# Patient Record
Sex: FEMALE | Race: ASIAN | NOT HISPANIC OR LATINO | Employment: UNEMPLOYED | URBAN - METROPOLITAN AREA
[De-identification: names, ages, dates, MRNs, and addresses within clinical notes are randomized per-mention and may not be internally consistent; named-entity substitution may affect disease eponyms.]

---

## 2023-08-25 ENCOUNTER — APPOINTMENT (OUTPATIENT)
Dept: MRI IMAGING | Facility: HOSPITAL | Age: 23
End: 2023-08-25
Attending: PHYSICIAN ASSISTANT
Payer: COMMERCIAL

## 2023-08-25 ENCOUNTER — APPOINTMENT (OUTPATIENT)
Dept: CT IMAGING | Facility: HOSPITAL | Age: 23
End: 2023-08-25
Attending: PHYSICIAN ASSISTANT
Payer: COMMERCIAL

## 2023-08-25 ENCOUNTER — HOSPITAL ENCOUNTER (EMERGENCY)
Facility: HOSPITAL | Age: 23
Discharge: HOME | End: 2023-08-26
Attending: EMERGENCY MEDICINE | Admitting: EMERGENCY MEDICINE
Payer: COMMERCIAL

## 2023-08-25 VITALS
WEIGHT: 95 LBS | RESPIRATION RATE: 16 BRPM | DIASTOLIC BLOOD PRESSURE: 56 MMHG | HEART RATE: 65 BPM | OXYGEN SATURATION: 97 % | TEMPERATURE: 98.4 F | SYSTOLIC BLOOD PRESSURE: 98 MMHG

## 2023-08-25 DIAGNOSIS — Z86.69 HISTORY OF MIGRAINE: ICD-10-CM

## 2023-08-25 DIAGNOSIS — R55 VASOVAGAL SYNCOPE: ICD-10-CM

## 2023-08-25 DIAGNOSIS — R51.9 HEADACHE: ICD-10-CM

## 2023-08-25 LAB
ANION GAP SERPL CALCULATED.3IONS-SCNC: 12 MMOL/L (ref 7–15)
BUN SERPL-MCNC: 14.7 MG/DL (ref 6–20)
CALCIUM SERPL-MCNC: 9.8 MG/DL (ref 8.6–10)
CHLORIDE SERPL-SCNC: 105 MMOL/L (ref 98–107)
CREAT SERPL-MCNC: 0.76 MG/DL (ref 0.51–0.95)
D DIMER PPP FEU-MCNC: 0.29 UG/ML FEU (ref 0–0.5)
DEPRECATED HCO3 PLAS-SCNC: 24 MMOL/L (ref 22–29)
ERYTHROCYTE [DISTWIDTH] IN BLOOD BY AUTOMATED COUNT: 12.4 % (ref 10–15)
GFR SERPL CREATININE-BSD FRML MDRD: >90 ML/MIN/1.73M2
GLUCOSE BLDC GLUCOMTR-MCNC: 88 MG/DL (ref 70–99)
GLUCOSE SERPL-MCNC: 102 MG/DL (ref 70–99)
HCG SERPL QL: NEGATIVE
HCT VFR BLD AUTO: 39.8 % (ref 35–47)
HGB BLD-MCNC: 13.6 G/DL (ref 11.7–15.7)
MAGNESIUM SERPL-MCNC: 2.2 MG/DL (ref 1.7–2.3)
MCH RBC QN AUTO: 28.2 PG (ref 26.5–33)
MCHC RBC AUTO-ENTMCNC: 34.2 G/DL (ref 31.5–36.5)
MCV RBC AUTO: 82 FL (ref 78–100)
PLATELET # BLD AUTO: 338 10E3/UL (ref 150–450)
POTASSIUM SERPL-SCNC: 4 MMOL/L (ref 3.4–5.3)
RBC # BLD AUTO: 4.83 10E6/UL (ref 3.8–5.2)
SODIUM SERPL-SCNC: 141 MMOL/L (ref 136–145)
TROPONIN T SERPL HS-MCNC: <6 NG/L
WBC # BLD AUTO: 13.4 10E3/UL (ref 4–11)

## 2023-08-25 PROCEDURE — 96360 HYDRATION IV INFUSION INIT: CPT | Mod: 59

## 2023-08-25 PROCEDURE — 70546 MR ANGIOGRAPH HEAD W/O&W/DYE: CPT

## 2023-08-25 PROCEDURE — 70496 CT ANGIOGRAPHY HEAD: CPT

## 2023-08-25 PROCEDURE — 85027 COMPLETE CBC AUTOMATED: CPT | Performed by: PHYSICIAN ASSISTANT

## 2023-08-25 PROCEDURE — A9585 GADOBUTROL INJECTION: HCPCS | Mod: JZ | Performed by: EMERGENCY MEDICINE

## 2023-08-25 PROCEDURE — 82962 GLUCOSE BLOOD TEST: CPT

## 2023-08-25 PROCEDURE — 255N000002 HC RX 255 OP 636: Mod: JZ | Performed by: EMERGENCY MEDICINE

## 2023-08-25 PROCEDURE — 250N000011 HC RX IP 250 OP 636: Mod: JZ | Performed by: PHYSICIAN ASSISTANT

## 2023-08-25 PROCEDURE — 99285 EMERGENCY DEPT VISIT HI MDM: CPT | Mod: 25

## 2023-08-25 PROCEDURE — 85379 FIBRIN DEGRADATION QUANT: CPT | Performed by: PHYSICIAN ASSISTANT

## 2023-08-25 PROCEDURE — 83735 ASSAY OF MAGNESIUM: CPT | Performed by: PHYSICIAN ASSISTANT

## 2023-08-25 PROCEDURE — 93005 ELECTROCARDIOGRAM TRACING: CPT | Performed by: PHYSICIAN ASSISTANT

## 2023-08-25 PROCEDURE — 258N000003 HC RX IP 258 OP 636: Performed by: PHYSICIAN ASSISTANT

## 2023-08-25 PROCEDURE — 36415 COLL VENOUS BLD VENIPUNCTURE: CPT | Performed by: PHYSICIAN ASSISTANT

## 2023-08-25 PROCEDURE — 250N000013 HC RX MED GY IP 250 OP 250 PS 637: Performed by: PHYSICIAN ASSISTANT

## 2023-08-25 PROCEDURE — 70498 CT ANGIOGRAPHY NECK: CPT

## 2023-08-25 PROCEDURE — 84703 CHORIONIC GONADOTROPIN ASSAY: CPT | Performed by: PHYSICIAN ASSISTANT

## 2023-08-25 PROCEDURE — 80048 BASIC METABOLIC PNL TOTAL CA: CPT | Performed by: PHYSICIAN ASSISTANT

## 2023-08-25 PROCEDURE — 84484 ASSAY OF TROPONIN QUANT: CPT | Performed by: PHYSICIAN ASSISTANT

## 2023-08-25 PROCEDURE — 70553 MRI BRAIN STEM W/O & W/DYE: CPT

## 2023-08-25 PROCEDURE — 96361 HYDRATE IV INFUSION ADD-ON: CPT

## 2023-08-25 RX ORDER — IBUPROFEN 200 MG
400 TABLET ORAL PRN
COMMUNITY

## 2023-08-25 RX ORDER — POLYETHYLENE GLYCOL 3350 17 G/17G
1 POWDER, FOR SOLUTION ORAL DAILY PRN
COMMUNITY
Start: 2023-07-12

## 2023-08-25 RX ORDER — SOMATROPIN 5 MG/1.5ML
INJECTION, SOLUTION SUBCUTANEOUS SEE ADMIN INSTRUCTIONS
COMMUNITY
Start: 2023-06-13

## 2023-08-25 RX ORDER — HYDROCORTISONE 10 MG/1
10 TABLET ORAL 2 TIMES DAILY
COMMUNITY
Start: 2023-07-12

## 2023-08-25 RX ORDER — ACETAMINOPHEN 325 MG/1
975 TABLET ORAL ONCE
Status: COMPLETED | OUTPATIENT
Start: 2023-08-25 | End: 2023-08-25

## 2023-08-25 RX ORDER — GADOBUTROL 604.72 MG/ML
4.5 INJECTION INTRAVENOUS ONCE
Status: COMPLETED | OUTPATIENT
Start: 2023-08-25 | End: 2023-08-25

## 2023-08-25 RX ORDER — IOPAMIDOL 755 MG/ML
75 INJECTION, SOLUTION INTRAVASCULAR ONCE
Status: COMPLETED | OUTPATIENT
Start: 2023-08-25 | End: 2023-08-25

## 2023-08-25 RX ORDER — ACETAMINOPHEN 500 MG
500 TABLET ORAL PRN
COMMUNITY

## 2023-08-25 RX ADMIN — IOPAMIDOL 75 ML: 755 INJECTION, SOLUTION INTRAVENOUS at 18:44

## 2023-08-25 RX ADMIN — ACETAMINOPHEN 975 MG: 325 TABLET ORAL at 17:19

## 2023-08-25 RX ADMIN — GADOBUTROL 4.5 ML: 604.72 INJECTION INTRAVENOUS at 21:44

## 2023-08-25 RX ADMIN — SODIUM CHLORIDE 1000 ML: 9 INJECTION, SOLUTION INTRAVENOUS at 17:15

## 2023-08-25 ASSESSMENT — ENCOUNTER SYMPTOMS
VOMITING: 0
NECK STIFFNESS: 0
DIZZINESS: 0
LIGHT-HEADEDNESS: 0
ABDOMINAL PAIN: 0
SPEECH DIFFICULTY: 0
SHORTNESS OF BREATH: 0
NECK PAIN: 0
CHILLS: 0
WEAKNESS: 1
HEADACHES: 1
FEVER: 0
NAUSEA: 1

## 2023-08-25 ASSESSMENT — ACTIVITIES OF DAILY LIVING (ADL)
ADLS_ACUITY_SCORE: 35

## 2023-08-25 NOTE — ED PROVIDER NOTES
"Emergency Department Encounter   NAME: Daniel Alvarez ; AGE: 23 year old female ; YOB: 2000 ; MRN: [id number] ; PCP: No primary care provider on file.   ED PROVIDER: Onelia Workman PA-C    Evaluation Date & Time:   8/25/2023  4:11 PM    CHIEF COMPLAINT:  Syncope and Headache      Impression and Plan   MDM:   Daniel Alvarez is a 23 year old female with a limited medical history who presents to the ED by EMS for evaluation of syncope/headache.  The patient presents to the emergency department after having a near syncopal episode at the [employer] today.  She reports that she only drink 1 bottle of water, [place], went on several rides, and then felt nauseated and near syncopal.  She laid down under a tree, and her group that she was with was giving her water and a cold pack, however she went \"in and out of consciousness\".  She was evaluated by paramedics, and her blood pressure was found to be 100 over 80s.  She reported a headache and continued nausea, and was transported to the ER for further evaluation.  On my evaluation of her, she is generally well-appearing, nontoxic, alert and holding a conversation.  At baseline per patient and mother.  She states she still has a headache in the setting of a migraine history which she usually treats with Tylenol and ibuprofen at home.  She is concerned about dehydration given her lack of fluid intake, and the temperature being in the 80s and quite humid today.  We discussed plan at this time for syncope work-up including EKG and labs.  We will treat her symptomatically with IV fluids and Tylenol.  Given her headache that started after her syncopal event, did consider subarachnoid hemorrhage, however she reports headache is consistent with atypical migraine, she appears quite comfortable, and I suspect this may be related to dehydration.  Will obtain CTA especially as patient is within the 6-hour window, and if " this is negative, no further work-up for subarachnoid hemorrhage is indicated at this time.    EKG shows sinus rhythm with sinus arrhythmia, no evidence of pathologic arrhythmia.  Nonspecific T wave inversions in V1 and V2 noted.  Troponin within normal range.  No concerns for ACS.  Blood glucose normal.  No metabolic derangements or KESHAV.  Negative pregnancy test.  No anemia.  Labs notable for a mild leukocytosis of 13.4 likely nonspecific.  Low suspicion for PE as she is not having any chest pain or shortness of breath, and vital signs are within normal limits, and she is PERC negative, however D-dimer sent and returned normal.  CTA was negative for subarachnoid hemorrhage or aneurysm, however radiologist did call to discuss prominent opacified superior sagittal sinus, however feels that this could also represent a subtle thin subdural hematoma though less likely.  Radiologist advised conservative follow-up with either repeat head CT in 12 hours, or obtaining MRI of brain as this would be definitive.  Discussed assessing for cerebral venous thrombosis on the CTA, however he is unable to fully visualize the transverse or sigmoid sinus, and advises dedicated imaging to rule this out. Discussed imaging options and admission with patient and mother, they would prefer MRI with hopes of discharging to home. MRI shows that area of thickening in the sagittal sinus on CT was a prominent vein coursing through. No evidence of subdural hematoma. No venous sinus thrombosis or other emergent findings. Nonspecific loss of fatty marrow on T1 sequence which is likely physiologic bone marrow hyperplasia, however did discuss with patient and mother. Mother felt this was seen on previous imaging and they are aware though advised discussion with patient's primary and included in paperwork in case they feel further workup is indicated on a nonemergent basis. Patient has been sleeping comfortably, headache is completely resolved, and  thorough work-up is reassuring.  At this time, I suspect her symptoms were due to vasovagal syncope in the setting of poor fluid intake in the heat which also resulted in headache.  We reviewed supportive measures for home, close follow-up in clinic, and concerning signs and symptoms to return to the ER.  Patient and mother verbalized understanding and she was discharged home.      Medical Decision Making    History:  Supplemental history from: Documented in chart, if applicable  External Record(s) reviewed: Documented in chart, if applicable.    Work Up:  Chart documentation includes differential considered and any EKGs or imaging independently interpreted by provider, where specified.  In additional to work up documented, I considered the following work up: Documented in chart, if applicable.    External consultation:  Discussion of management with another provider: Documented in chart, if applicable    Complicating factors:  Care impacted by chronic illness: Chronic Pain and Other: chiari malformation  Care affected by social determinants of health: N/A    Disposition considerations: Admission considered though patient discharged. No recommendations on prescription strength medication(s). See documentation for any additional details.      ED COURSE:  4:12 PM I met and introduced myself to the patient. I gathered initial history and performed my physical exam. We discussed plan for initial workup.   5:09 PM Nursing staff wasn't able to get an IV in, so called PICC team to place the IV.  7:10 PM [facility] radiology, Dr. Gringon called about CTA results.   7:18 PM I have staffed the patient with Dr. Gary, ED MD, who has evaluated the patient and agrees with all aspects of today's care.   7:32 PM I called [place] [facility], and spoke with Dr. Ferrera.   7:43 PM I rechecked the patient - headache resolved. Discussed plan for MRI which mother and patient are agreeable to.   12:00 AM I rechecked the patient and  "discussed results, discharge, follow up, and reasons to return to the ED.     At the conclusion of the encounter I discussed the results of all the tests and the disposition. The questions were answered. The patient or family acknowledged understanding and was agreeable with the care plan.    FINAL IMPRESSION:    ICD-10-CM    1. Vasovagal syncope  R55       2. Headache  R51.9       3. History of migraine  Z86.69             MEDICATIONS GIVEN IN THE EMERGENCY DEPARTMENT:  Medications   0.9% sodium chloride BOLUS (0 mLs Intravenous Stopped 8/25/23 1953)   acetaminophen (TYLENOL) tablet 975 mg (975 mg Oral $Given 8/25/23 1719)   iopamidol (ISOVUE-370) solution 75 mL (75 mLs Intravenous $Given 8/25/23 1844)   gadobutrol (GADAVIST) injection 4.5 mL (4.5 mLs Intravenous $Given 8/25/23 2144)         NEW PRESCRIPTIONS STARTED AT TODAY'S ED VISIT:  New Prescriptions    No medications on file         HPI   Patient information was obtained from: patient and family.   Use of Intrepreter: N/A     Daniel Alvarez is a 23 year old female with a limited medical history who presents to the ED by EMS for evaluation of syncope/headache.     Patient arrived at the [facility] today around 11 AM. This afternoon shortly after a couple of spinning/slingshot rides, she started feeling nauseous. She felt like she couldn't walk and \"collapsed\". Family described her as \"fading in and out.\" She was able to lay down in the shade for several minutes, and the syncopal episode lasted about 5-10 minutes. Patient's family and friend gave her some water and ice, and they put ice water bottle on her neck. She was evaluated by [facility] medics who noted her blood pressure was slightly low around 100/80. With the syncopal episode, she also developed a headache which she describes as a pressure sensation. She has a history of frequent migraines due to Chiari malformation, but her current headache is different due to the pressure sensation. The headache " persists in the ED today. She normally takes Tylenol or Ibuprofen for her migraines. She has no previous history of syncope. Patient denies any associated chest pain, shortness of breath, and vomiting. She only drank one water bottle today prior to the syncopal episode today. She does not report any other complaints or concerns at this time. Of note, the patient takes daily Hydrocortisone and Growth Hormone. She has low vision at baseline that is unchanged today.      REVIEW OF SYSTEMS:  Review of Systems   Constitutional:  Negative for chills and fever.   Respiratory:  Negative for shortness of breath.    Cardiovascular:  Negative for chest pain.   Gastrointestinal:  Positive for nausea. Negative for abdominal pain and vomiting.   Musculoskeletal:  Negative for neck pain and neck stiffness.   Neurological:  Positive for syncope, weakness (generalized) and headaches (pressure sensation). Negative for dizziness, speech difficulty and light-headedness.   All other systems reviewed and are negative.        Medical History     History reviewed. No pertinent past medical history.    History reviewed. No pertinent surgical history.    History reviewed. No pertinent family history.         acetaminophen (TYLENOL) 500 MG tablet  hydrocortisone (CORTEF) 10 MG tablet  ibuprofen (ADVIL/MOTRIN) 200 MG tablet  NORDITROPIN FLEXPRO 5 MG/1.5ML SOPN  polyethylene glycol (MIRALAX) 17 GM/Dose powder          Physical Exam     First Vitals:  Patient Vitals for the past 24 hrs:   BP Temp Temp src Pulse Resp SpO2 Weight   08/25/23 2317 98/56 -- -- 65 -- 97 % --   08/25/23 1559 105/86 98.4  F (36.9  C) Oral 94 16 96 % 43.1 kg (95 lb)         PHYSICAL EXAM:   Physical Exam  Vitals and nursing note reviewed.   Constitutional:       General: She is not in acute distress.     Appearance: She is not ill-appearing or toxic-appearing.   HENT:      Head: Normocephalic and atraumatic.   Eyes:      Conjunctiva/sclera: Conjunctivae normal.       Comments: Legally blind. Pupils are equal and round.   Cardiovascular:      Rate and Rhythm: Normal rate and regular rhythm.      Pulses: Normal pulses.      Heart sounds: Normal heart sounds. No murmur heard.  Pulmonary:      Effort: Pulmonary effort is normal.      Breath sounds: Normal breath sounds.   Abdominal:      General: Abdomen is flat. There is no distension.      Palpations: Abdomen is soft.      Tenderness: There is no abdominal tenderness. There is no guarding or rebound.   Musculoskeletal:      Cervical back: Normal range of motion and neck supple. No rigidity.   Skin:     General: Skin is warm and dry.   Neurological:      Mental Status: She is alert and oriented to person, place, and time. Mental status is at baseline.      GCS: GCS eye subscore is 4. GCS verbal subscore is 5. GCS motor subscore is 6.      Comments: Legally blind. No cranial nerve deficits. No facial asymmetry. Following commands. Has normal speech. No dysarthria or aphasia. No neglect or inattention. Negative pronator drift. 5/5 strength with , flexion extension at elbow joint, flexion at shoulder and hip joint, dorsiflexion and plantarflexion against resistance.  Sensation to light touch intact to face and all extremities.             Results     LAB:  All pertinent labs reviewed and interpreted  Labs Ordered and Resulted from Time of ED Arrival to Time of ED Departure   CBC WITH PLATELETS - Abnormal       Result Value    WBC Count 13.4 (*)     RBC Count 4.83      Hemoglobin 13.6      Hematocrit 39.8      MCV 82      MCH 28.2      MCHC 34.2      RDW 12.4      Platelet Count 338     BASIC METABOLIC PANEL - Abnormal    Sodium 141      Potassium 4.0      Chloride 105      Carbon Dioxide (CO2) 24      Anion Gap 12      Urea Nitrogen 14.7      Creatinine 0.76      Calcium 9.8      Glucose 102 (*)     GFR Estimate >90     GLUCOSE BY METER - Normal    GLUCOSE BY METER POCT 88     MAGNESIUM - Normal    Magnesium 2.2     HCG  QUALITATIVE PREGNANCY - Normal    hCG Serum Qualitative Negative     D DIMER QUANTITATIVE - Normal    D-Dimer Quantitative 0.29     TROPONIN T, HIGH SENSITIVITY - Normal    Troponin T, High Sensitivity <6         RADIOLOGY:  MRV Brain wo & w Contrast   Final Result   IMPRESSION:   HEAD MRI:   1.  No acute infarct.   2.  Absent septum pellucidum. Optic nerves appear diminutive. 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    3.  Chiari I decompression.   4.  Diffuse loss normal fatty marrow on T1 sequence, nonspecific. Findings likely related to physiologic bone marrow hyperplasia given age and gender. Expanded differential provided above.      HEAD MRV:   1.  No dural venous sinus thrombosis or occlusion.   2.  Superior sagittal sinus at the vertex is atretic. There is a prominent vein coursing within the superior falx from the posterior aspect superior sagittal sinus to the anterior aspect superior sagittal sinus. This corresponds to an area of thickening    within the falx seen on CT head.         MR Brain w/o & w Contrast   Final Result   IMPRESSION:   HEAD MRI:   1.  No acute infarct.   2.  Absent septum pellucidum. Optic nerves appear diminutive. 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    3.  Chiari I decompression.   4.  Diffuse loss normal fatty marrow on T1 sequence, nonspecific. Findings likely related to physiologic bone marrow hyperplasia given age and gender. Expanded differential provided above.      HEAD MRV:   1.  No dural venous sinus thrombosis or occlusion.   2.  Superior sagittal sinus at the vertex is atretic. There is a prominent vein coursing within the superior falx from the posterior aspect superior sagittal sinus to the anterior aspect superior sagittal sinus. This corresponds to an area of thickening    within the falx seen on CT head.         CTA Head Neck with Contrast   Final Result   Addendum (preliminary) 1 of 1   Addendum/   impression:      Dr. Ferrera discussed the results  with Dr. Workman on 8/25/2023 7:20 PM CDT    by telephone.      Final   IMPRESSION:    HEAD CT:   1.  Thickening of the falx measuring 4 mm on axial image #24, series 5 and coronal image #37, series 7. On the CT angiogram there is a prominent opacified superior sagittal sinus. This may be secondary to a prominent superior sagittal sinus, though this    may represent a subtle thin subdural hematoma. As a conservative measure, recommend follow-up head CT to ensure stability.   2.  No other evidence of acute intracranial hemorrhage or mass effect.   3.  Findings consistent with septo-optic dysplasia, unchanged.       HEAD CTA:    1.  No evidence of pathologic vascular occlusion or saccular aneurysm within the visualized intracranial circulation by CT angiography.      NECK CTA:   1.  No evidence of hemodynamically significant stenosis within either internal carotid artery within the neck.   2.  No evidence of arterial dissection.            ECG:  Performed at: 16:49:12    Impression: sinus rhythm with sinus arrhythmia. Normal ECG.     Rate: 75  Rhythm: sinus   Axis: 33 75 36  DC Interval: 130  QRS Interval: 68  QTc Interval: 397  ST Changes: None  Comparison: None    EKG results reviewed and interpreted by Dr. Cookie ED MD.       INguyen , am serving as a scribe to document services personally performed by Onelia Workman PA-C, based on my observation and the provider's statements to me. IOnelia PA-C attest that Nguyen Wright  is acting in a scribe capacity, has observed my performance of the services and has documented them in accordance with my direction.       Onelia Workman PA-C   Emergency Medicine   [facility] EMERGENCY DEPARTMENT       Onelia Workman PA-C  08/26/23 0008

## 2023-08-25 NOTE — ED NOTES
Spoke with CT regarding performing CTA with 20g that was placed by PICC.  States they will do it but worry that test may not be what is needed.  IV flushes very well.

## 2023-08-25 NOTE — ED TRIAGE NOTES
"Pt brought in by [facility].  [place] one hour pta.  Pt states she did eat and drink today.  Pt denies hitting her head, but c/o a \"10\" out of 10 headache since after her syncopal episode.  Pt also c/o weakness since episode.  Pt's blood glucose in triage is 88 mg/dL.  Pt is legally blind.     Triage Assessment       Row Name 08/25/23 0736       Triage Assessment (Adult)    Airway WDL WDL       Respiratory WDL    Respiratory WDL WDL       Skin Circulation/Temperature WDL    Skin Circulation/Temperature WDL WDL       Cardiac WDL    Cardiac WDL WDL       Peripheral/Neurovascular WDL    Peripheral Neurovascular WDL WDL       Cognitive/Neuro/Behavioral WDL    Cognitive/Neuro/Behavioral WDL WDL                    "

## 2023-08-26 NOTE — DISCHARGE INSTRUCTIONS
As we discussed, your MRI showed no emergent findings.  Please continue to stay well-hydrated, stay out of the heat for the next several days, use Tylenol or Motrin for mild headache.  If it anytime you have a another episode where you pass out, develop a severe headache, vomiting, BH changes, confusion, arm or leg weakness or numbness, or any new or concerning symptoms please return to the ER for further evaluation.    As we discussed there was some loss of bone marrow on her MRI. This is probably an incidental finding, however discuss with her primary doctor to see if there is any other imaging they want to obtain.

## 2023-08-26 NOTE — ED PROVIDER NOTES
EMERGENCY DEPARTMENT NOTE     Name: Daniel Alvarez    Age/Sex: 23 year old female   MRN: [id number]   Evaluation Date & Time:  8/25/2023  4:11 PM    PCP:    No Ref-Primary, Physician   ED Provider: Arik Gary D.O.       CHIEF COMPLAINT    Syncope and Headache       DIAGNOSIS & DISPOSITION/MEDICAL DECISION MAKING     1. Vasovagal syncope    2. Headache    3. History of migraine        Daniel Alvarez is a 23 year old female with a relevant past history of chronic pain and chiari malformation who presents to the emergency department for evaluation of syncope and headache.    Differential  diagnosis considered included but not limited to subarachnoid hemorrhage or leakage, ICH, dural vein thrombosis, epidural hematoma    Medical Decision Making  Initial CT question whether there is a thin layer of subdural.  Subsequent MRI excluded this.  No evidence of other process including dural vein thrombosis.  Agree with current discharge plan as documented by the physicians assistant Janine Workman    Interventions:Acetaminophen  Discharge Vital Signs:BP 98/56   Pulse 65   Temp 98.4  F (36.9  C) (Oral)   Resp 16   Wt 43.1 kg (95 lb)   SpO2 97%      DISPOSITION: Home    Diagnostic studies:  Imaging:  MRV Brain wo & w Contrast   Final Result   IMPRESSION:   HEAD MRI:   1.  No acute infarct.   2.  Absent septum pellucidum. Optic nerves appear diminutive. 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    3.  Chiari I decompression.   4.  Diffuse loss normal fatty marrow on T1 sequence, nonspecific. Findings likely related to physiologic bone marrow hyperplasia given age and gender. Expanded differential provided above.      HEAD MRV:   1.  No dural venous sinus thrombosis or occlusion.   2.  Superior sagittal sinus at the vertex is atretic. There is a prominent vein coursing within the superior falx from the posterior aspect superior sagittal sinus to the anterior aspect superior sagittal sinus. This corresponds to  an area of thickening    within the falx seen on CT head.         MR Brain w/o & w Contrast   Final Result   IMPRESSION:   HEAD MRI:   1.  No acute infarct.   2.  Absent septum pellucidum. Optic nerves appear diminutive. 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    3.  Chiari I decompression.   4.  Diffuse loss normal fatty marrow on T1 sequence, nonspecific. Findings likely related to physiologic bone marrow hyperplasia given age and gender. Expanded differential provided above.      HEAD MRV:   1.  No dural venous sinus thrombosis or occlusion.   2.  Superior sagittal sinus at the vertex is atretic. There is a prominent vein coursing within the superior falx from the posterior aspect superior sagittal sinus to the anterior aspect superior sagittal sinus. This corresponds to an area of thickening    within the falx seen on CT head.         CTA Head Neck with Contrast   Final Result   Addendum (preliminary) 1 of 1   Addendum/   impression:      Dr. Ferrera discussed the results with Dr. Workman on 8/25/2023 7:20 PM CDT    by telephone.      Final   IMPRESSION:    HEAD CT:   1.  Thickening of the falx measuring 4 mm on axial image #24, series 5 and coronal image #37, series 7. On the CT angiogram there is a prominent opacified superior sagittal sinus. This may be secondary to a prominent superior sagittal sinus, though this    may represent a subtle thin subdural hematoma. As a conservative measure, recommend follow-up head CT to ensure stability.   2.  No other evidence of acute intracranial hemorrhage or mass effect.   3.  Findings consistent with septo-optic dysplasia, unchanged.       HEAD CTA:    1.  No evidence of pathologic vascular occlusion or saccular aneurysm within the visualized intracranial circulation by CT angiography.      NECK CTA:   1.  No evidence of hemodynamically significant stenosis within either internal carotid artery within the neck.   2.  No evidence of arterial dissection.        "  Lab:  Labs Ordered and Resulted from Time of ED Arrival to Time of ED Departure   CBC WITH PLATELETS - Abnormal       Result Value    WBC Count 13.4 (*)     RBC Count 4.83      Hemoglobin 13.6      Hematocrit 39.8      MCV 82      MCH 28.2      MCHC 34.2      RDW 12.4      Platelet Count 338     BASIC METABOLIC PANEL - Abnormal    Sodium 141      Potassium 4.0      Chloride 105      Carbon Dioxide (CO2) 24      Anion Gap 12      Urea Nitrogen 14.7      Creatinine 0.76      Calcium 9.8      Glucose 102 (*)     GFR Estimate >90     GLUCOSE BY METER - Normal    GLUCOSE BY METER POCT 88     MAGNESIUM - Normal    Magnesium 2.2     HCG QUALITATIVE PREGNANCY - Normal    hCG Serum Qualitative Negative     D DIMER QUANTITATIVE - Normal    D-Dimer Quantitative 0.29     TROPONIN T, HIGH SENSITIVITY - Normal    Troponin T, High Sensitivity <6                 Triage note reviewed:Pt brought in by [facility].  [place] episode one hour pta.  Pt states she did eat and drink today.  Pt denies hitting her head, but c/o a \"10\" out of 10 headache since after her syncopal episode.  Pt also c/o weakness since episode.  Pt's blood glucose in triage is 88 mg/dL.  Pt is legally blind.     Triage Assessment       Row Name 08/25/23 160       Triage Assessment (Adult)    Airway WDL WDL       Respiratory WDL    Respiratory WDL WDL       Skin Circulation/Temperature WDL    Skin Circulation/Temperature WDL WDL       Cardiac WDL    Cardiac WDL WDL       Peripheral/Neurovascular WDL    Peripheral Neurovascular WDL WDL       Cognitive/Neuro/Behavioral WDL    Cognitive/Neuro/Behavioral WDL WDL                        History:  Supplemental history from: Patient's mother  External Record(s) reviewed: Documented in chart, if applicable.    Work Up:  Chart documentation includes differential considered and any EKGs or imaging independently interpreted by provider, where specified.  In additional " to work up documented, I considered the following work up: Documented in chart, if applicable.    External consultation:  Discussion of management with another provider: Janine Workman physicians assistant    Complicating factors:  Care impacted by chronic illness: Chronic Pain and Other: Chiari formation  Care affected by social determinants of health: N/A    Disposition considerations: Charged home    At the conclusion of the encounter I discussed the results of all of the tests and the disposition. The questions were answered. The patient or family acknowledged understanding and was agreeable with the care plan.    TOTAL CRITICAL CARE TIME (EXCLUDING PROCEDURES): Not applicable    PROCEDURES:   None    EMERGENCY DEPARTMENT COURSE   7:18 PM Patient was staffed with me by Janine Workman PA-C.  7:23 PM I met with the patient to gather history and to perform my initial exam.  We discussed treatment options and the plan for care while in the Emergency Department.    ED INTERVENTIONS     Medications   0.9% sodium chloride BOLUS (0 mLs Intravenous Stopped 8/25/23 1953)   acetaminophen (TYLENOL) tablet 975 mg (975 mg Oral $Given 8/25/23 1719)   iopamidol (ISOVUE-370) solution 75 mL (75 mLs Intravenous $Given 8/25/23 1844)   gadobutrol (GADAVIST) injection 4.5 mL (4.5 mLs Intravenous $Given 8/25/23 2144)       DISCHARGE MEDICATIONS        Review of your medicines        UNREVIEWED medicines. Ask your doctor about these medicines        Dose / Directions   acetaminophen 500 MG tablet  Commonly known as: TYLENOL      Dose: 500 mg  Take 500 mg by mouth as needed for mild pain  Refills: 0     hydrocortisone 10 MG tablet  Commonly known as: CORTEF      Dose: 10 mg  Take 10 mg by mouth 2 times daily 10 mg in the AM, 5 mg in the afternoon. May double each dose if needed for fever or bad days.  Refills: 0     ibuprofen 200 MG tablet  Commonly known as: ADVIL/MOTRIN      Dose: 400 mg  Take 400 mg by mouth as needed for  "pain  Refills: 0     Norditropin FlexPro 5 MG/1.5ML Sopn  Generic drug: Somatropin      Inject Subcutaneous See Admin Instructions Inject daily as directed  Refills: 0     polyethylene glycol 17 GM/Dose powder  Commonly known as: MIRALAX      Dose: 1 Capful  Take 1 Capful by mouth daily as needed  Refills: 0                INFORMATION SOURCE AND LIMITATIONS    History/Exam limitations: None  Patient information was obtained from: patient  Use of : N/A    HISTORY OF PRESENT ILLNESS   Daniel Alvarez is a 23 year old year old female with a relevant past history of chronic pain and chiari, who presents to this ED via walk-in with mother for evaluation of syncope and headache.    Patient's mother reports that patient was at the [facility] today with a group for several hours and only had 1 bottle of water to drink. She then felt sudden onset nausea and felt light headedness. She was laid down under a tree and her group was trying to give her water but she was \"in and out of consciousness.\" She denies head trauma or falls prior to the syncopal episode. She currently reports of nausea and a 10/10 headache. She also states that her \"heart hurts\" but denies associating shortness of breath. She denies history of syncope. There were no other concerns/complaints at this time.    REVIEW OF SYSTEMS:   All other systems reviewed and are negative except as noted above in HPI.    PATIENT HISTORY   History reviewed. No pertinent past medical history.  There is no problem list on file for this patient.    History reviewed. No pertinent surgical history.    No Known Allergies    OUTPATIENT MEDICATIONS     Discharge Medication List as of 8/25/2023 11:56 PM         Vitals:    08/25/23 1559 08/25/23 2317   BP: 105/86 98/56   Pulse: 94 65   Resp: 16    Temp: 98.4  F (36.9  C)    TempSrc: Oral    SpO2: 96% 97%   Weight: 43.1 kg (95 lb)        Physical Exam   Constitutional: Oriented to person, place, and time. " Appears well-developed and well-nourished.   HEENT:    Head: Atraumatic.   Neck: Normal range of motion. Neck supple.   Cardiovascular: Normal rate, regular rhythm and normal heart sounds.    Pulmonary/Chest: Normal effort  and breath sounds normal.   Abdominal: Soft. Bowel sounds are normal.   Musculoskeletal: Normal range of motion.   Neurological: Alert and oriented to person, place, and time. Normal strength. No sensory deficit. No cranial nerve deficit.  Skin: Skin is warm and dry.   Psychiatric: Normal mood and affect. Behavior is normal. Thought content normal.     DIAGNOSTICS    LABORATORY FINDINGS (REVIEWED AND INTERPRETED):  Labs Ordered and Resulted from Time of ED Arrival to Time of ED Departure   CBC WITH PLATELETS - Abnormal       Result Value    WBC Count 13.4 (*)     RBC Count 4.83      Hemoglobin 13.6      Hematocrit 39.8      MCV 82      MCH 28.2      MCHC 34.2      RDW 12.4      Platelet Count 338     BASIC METABOLIC PANEL - Abnormal    Sodium 141      Potassium 4.0      Chloride 105      Carbon Dioxide (CO2) 24      Anion Gap 12      Urea Nitrogen 14.7      Creatinine 0.76      Calcium 9.8      Glucose 102 (*)     GFR Estimate >90     GLUCOSE BY METER - Normal    GLUCOSE BY METER POCT 88     MAGNESIUM - Normal    Magnesium 2.2     HCG QUALITATIVE PREGNANCY - Normal    hCG Serum Qualitative Negative     D DIMER QUANTITATIVE - Normal    D-Dimer Quantitative 0.29     TROPONIN T, HIGH SENSITIVITY - Normal    Troponin T, High Sensitivity <6           IMAGING (REVIEWED AND INTERPRETED):  MRV Brain wo & w Contrast   Final Result   IMPRESSION:   HEAD MRI:   1.  No acute infarct.   2.  Absent septum pellucidum. Optic nerves appear diminutive. 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    3.  Chiari I decompression.   4.  Diffuse loss normal fatty marrow on T1 sequence, nonspecific. Findings likely related to physiologic bone marrow hyperplasia given age and gender. Expanded differential provided  above.      HEAD MRV:   1.  No dural venous sinus thrombosis or occlusion.   2.  Superior sagittal sinus at the vertex is atretic. There is a prominent vein coursing within the superior falx from the posterior aspect superior sagittal sinus to the anterior aspect superior sagittal sinus. This corresponds to an area of thickening    within the falx seen on CT head.         MR Brain w/o & w Contrast   Final Result   IMPRESSION:   HEAD MRI:   1.  No acute infarct.   2.  Absent septum pellucidum. Optic nerves appear diminutive. 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    3.  Chiari I decompression.   4.  Diffuse loss normal fatty marrow on T1 sequence, nonspecific. Findings likely related to physiologic bone marrow hyperplasia given age and gender. Expanded differential provided above.      HEAD MRV:   1.  No dural venous sinus thrombosis or occlusion.   2.  Superior sagittal sinus at the vertex is atretic. There is a prominent vein coursing within the superior falx from the posterior aspect superior sagittal sinus to the anterior aspect superior sagittal sinus. This corresponds to an area of thickening    within the falx seen on CT head.         CTA Head Neck with Contrast   Final Result   Addendum (preliminary) 1 of 1   Addendum/   impression:      Dr. Ferrera discussed the results with Dr. Workman on 8/25/2023 7:20 PM CDT    by telephone.      Final   IMPRESSION:    HEAD CT:   1.  Thickening of the falx measuring 4 mm on axial image #24, series 5 and coronal image #37, series 7. On the CT angiogram there is a prominent opacified superior sagittal sinus. This may be secondary to a prominent superior sagittal sinus, though this    may represent a subtle thin subdural hematoma. As a conservative measure, recommend follow-up head CT to ensure stability.   2.  No other evidence of acute intracranial hemorrhage or mass effect.   3.  Findings consistent with septo-optic dysplasia, unchanged.       HEAD CTA:    1.  No  evidence of pathologic vascular occlusion or saccular aneurysm within the visualized intracranial circulation by CT angiography.      NECK CTA:   1.  No evidence of hemodynamically significant stenosis within either internal carotid artery within the neck.   2.  No evidence of arterial dissection.              I, Julia Romero, am serving as a scribe to document services personally performed by Arik Gary D.O., based on my observation and the provider s statements to me.    I, Arik Gary D.O., attest that Julia Romero is acting in a scribe capacity, has observed my performance of the services and has documented them in accordance with my direction.    Arik Gary D.O.  EMERGENCY MEDICINE   08/25/23  [facility] EMERGENCY DEPARTMENT  [address] [place] [id number] [phone]  Dept: [phone]     Arik Gary DO  08/27/23 0843

## 2023-08-26 NOTE — MEDICATION SCRIBE - ADMISSION MEDICATION HISTORY
Medication Scribe Admission Medication History    Admission medication history is complete. The information provided in this note is only as accurate as the sources available at the time of the update.    Medication reconciliation/reorder completed by provider prior to medication history? No    Information Source(s): Patient and Family member via in-person    Pertinent Information: Patient had no medications in our PTA list prior to admission.    Patient dosing for 10 mg Cortef: Patient to take 1 tablet in the morning and a half tablet in the afternoon. Patient can decide on their own to double each dose based on their current health condition.     Changes made to PTA medication list:  Added: All  Deleted: None  Changed: None    Medication Affordability:  Not including over the counter (OTC) medications, was there a time in the past 3 months when you did not take your medications as prescribed because of cost?: No    Allergies reviewed with patient and updates made in EHR: yes    Medication History Completed By: Christopher Smith 8/25/2023 8:36 PM    Prior to Admission medications    Medication Sig Last Dose Taking? Auth Provider Long Term End Date   acetaminophen (TYLENOL) 500 MG tablet Take 500 mg by mouth as needed for mild pain Past Month at prn Yes Reported, Patient     hydrocortisone (CORTEF) 10 MG tablet Take 10 mg by mouth 2 times daily 10 mg in the AM, 5 mg in the afternoon. May double each dose if needed for fever or bad days.  Yes Reported, Patient Yes    ibuprofen (ADVIL/MOTRIN) 200 MG tablet Take 400 mg by mouth as needed for pain Past Month at prn Yes Reported, Patient     NORDITROPIN FLEXPRO 5 MG/1.5ML SOPN Inject Subcutaneous See Admin Instructions Inject daily as directed  Yes Reported, Patient Yes    polyethylene glycol (MIRALAX) 17 GM/Dose powder Take 1 Capful by mouth daily as needed  Yes Reported, Patient

## 2023-09-01 LAB
ATRIAL RATE - MUSE: 75 BPM
DIASTOLIC BLOOD PRESSURE - MUSE: NORMAL MMHG
INTERPRETATION ECG - MUSE: NORMAL
P AXIS - MUSE: 33 DEGREES
PR INTERVAL - MUSE: 130 MS
QRS DURATION - MUSE: 68 MS
QT - MUSE: 356 MS
QTC - MUSE: 397 MS
R AXIS - MUSE: 75 DEGREES
SYSTOLIC BLOOD PRESSURE - MUSE: NORMAL MMHG
T AXIS - MUSE: 36 DEGREES
VENTRICULAR RATE- MUSE: 75 BPM